# Patient Record
(demographics unavailable — no encounter records)

---

## 2024-12-03 NOTE — PHYSICAL EXAM
[General Appearance - In No Acute Distress] : in no acute distress [] : no respiratory distress [Auscultation Breath Sounds / Voice Sounds] : lungs were clear to auscultation bilaterally [Respiration, Rhythm And Depth] : normal respiratory rhythm and effort [Heart Rate And Rhythm] : heart rate was normal and rhythm regular [Affect] : the affect was normal [Mood] : the mood was normal X Size Of Lesion In Cm (Optional): 1.2

## 2024-12-05 NOTE — HISTORY OF PRESENT ILLNESS
[FreeTextEntry1] : Pt presents for followup on hypertension/hyperlipidemia/type 2 diabetes. Patient is currently fasting for today's labs.Patient is currently Managing her hypertension dietarily, on simvastatin for hyperlipidemia, is controlling her type 2 diabetes dietarily and is on levothyroxine for hypothyroidism.  -feels well -got flu shot at pharm

## 2024-12-05 NOTE — ASSESSMENT
[FreeTextEntry1] : Venipuncture done in our office, Labs sent out and further recommendations will be made based on lab results. Patient advised to continue present medications with diet/exercise and specialists followup. Patient will return to office in 3months for CP  last bone density was 9/2023 Last mammogram was 10/2024 Last colonoscopy was 11/2022 with followup in 3-5 years  specialists which include 1. ophthalmology-Dr. Leal 3/2024 2. gynecology-Dr. Heller 3. pulmonary Qyear-Dr. Lockhart-NO NEED FOR FOLLOW UP PER PT 4. cardiology-Dr. Odom 5. podiatry- 6.derm Qyear-Dr. Vasquez vaccines are UTD/ +got covid vaccines Microalbumin=3/2024 3/2018= hepatitis C and HIV screening echo via cardio

## 2025-04-24 NOTE — PHYSICAL EXAM
[FreeTextEntry1] : Heberden's nodes on multiple DIP joints [#1 Diminished] : number 1 was normal [#2 Diminished] : number 2 was normal [#3 Diminished] : number 3 was normal [#4 Diminished] : number 4 was normal [#5 Diminished] : number 5 was normal [#6 Diminished] : number 6 was normal [#7 Diminished] : number 7 was normal [#8 Diminished] : number 8 was normal [#9 Diminished] : number 9 was normal [#10 Diminished] : number 10 was normal [Over the Past 2 Weeks, Have You Felt Down, Depressed, or Hopeless?] : 1.) Over the past 2 weeks, have you felt down, depressed, or hopeless? No [Over the Past 2 Weeks, Have You Felt Little Interest or Pleasure Doing Things?] : 2.) Over the past 2 weeks, have you felt little interest or pleasure doing things? No

## 2025-04-24 NOTE — ASSESSMENT
[FreeTextEntry1] : 69-year-old female with controlled diabetes with lifestyle changes, controlled hypertension on no meds and hyperlipidemia on simvastatin currently medically stable.  renal protective ACEI/ARB was discussed previously with the patient continuing to decline  Recent cardiac evaluation with higher coronary calcium CAT scan score therefore cardiology changed simvastatin to atorvastatin about 2 months ago. echocardiogram October 2021 with normal LVEF with mild MR/TR and trivial pericardial effusion.  Increased musculoskeletal symptoms including finger pains with Heberden's nodes, right hip and low back pain.  Referral given for x-ray of her hands and CT of her right hip and lumbar spine.  Patient unable to tolerate MRIs.  Patient is encouraged to continue diet and exercise and maintain her weight. Continue present medications  Colonoscopy November 2022 with follow-up in 5 years Mammography October 2024  Bone density September 2023  ophthalmology yearly GYN April 2024  All vaccines up-to-date including Shingrix influenza vaccine and COVID vaccines already received  venipuncture done today in the office Followup in 6 months

## 2025-04-24 NOTE — HEALTH RISK ASSESSMENT
[None] : Patient does not have any barriers to medication adherence [Yes] : Reviewed medication list for presence of high-risk medications. 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

## 2025-04-24 NOTE — HISTORY OF PRESENT ILLNESS
[FreeTextEntry1] : 69-year-old female with diet/exercise controlled type 2 diabetes on no medication, hypertension previously on ramipril, hyperlipidemia on atorvastatin and hypothyroidism on levothyroxine presents for her yearly physical.  Patient with also history of asthma for which she was following with pulmonary Dr. Lockhart on a yearly basis. There was question of interstitial lung disease which he feels the patient does not have.The patient had a followup CAT scan October 2017 with no chronic pulmonary issues. Also her asthma is essentially nonexistent therefore no pulmonary followup necessary. According to the patient no pulmonary followup needed   Also follows with cardiology Dr. Odom on a yearly basis with history of chronic, trivial pericardial effusion which is of no clinical significance. cardiology evaluation 2021 with echocardiogram October 2021 showing normal LVEF with mild MR/TR and continued trivial pericardial effusion. Carotid duplex showing no plaque or stenosis. Patient with recent follow-up with cardiology stating that she had a follow-up coronary calcium CAT scan with previous with a minimal score but increased to 130.  As a result simvastatin 20 mg daily changed to atorvastatin 20 mg daily about 2 months ago.  Medications are the same except ramipril was discontinued by the patient secondary to potential side effects and she has continued with good blood pressure. Discussed about the renal protective benefits of it  The patient is generally feeling well without any complaints including no chest pain, palpitations or shortness of breath. She continues to watch her diet with exercise and maintenance of her weight  Her main complaints are musculoskeletal with increased finger pains with bumps on the ends of her fingers and also chronic but increased low back and right hip pain.  Bone density June 2021 showing back with osteoporosis and femoral neck with osteopenia therefore patient started on Boniva June 2021 with good tolerance.  The patient lives with her significant other and is retired as a postal